# Patient Record
Sex: FEMALE | Race: BLACK OR AFRICAN AMERICAN
[De-identification: names, ages, dates, MRNs, and addresses within clinical notes are randomized per-mention and may not be internally consistent; named-entity substitution may affect disease eponyms.]

---

## 2020-01-23 ENCOUNTER — HOSPITAL ENCOUNTER (EMERGENCY)
Dept: HOSPITAL 54 - ER | Age: 25
Discharge: HOME | End: 2020-01-23
Payer: SELF-PAY

## 2020-01-23 VITALS — DIASTOLIC BLOOD PRESSURE: 70 MMHG | SYSTOLIC BLOOD PRESSURE: 112 MMHG

## 2020-01-23 VITALS — HEIGHT: 60 IN | WEIGHT: 130 LBS | BODY MASS INDEX: 25.52 KG/M2

## 2020-01-23 DIAGNOSIS — J10.1: Primary | ICD-10-CM

## 2020-01-23 DIAGNOSIS — R07.89: ICD-10-CM

## 2020-01-23 DIAGNOSIS — Z88.0: ICD-10-CM

## 2020-01-23 NOTE — NUR
PT CAME INTO ER W/ FAMILY TO  BED 11 C/O COUGH. PT STATES THAT SHE WAS 
DIAGNOSED WITH INFLUENZA B LAST WEEK ON THURSDAY 01/16 AND PNEUMONIA ON SUNDAY 01/19. PT STATES THAT SHE HAD SOME CHEST PAIN LAST NIGHT FROM COUGHING. 
CURRENTLY DENIES ANY PAIN. PT STATES THAT SHE HAS PRODUCTIVE COUGH W/ CLEAR 
SPUTUM. NO SORE THROAT. AAOX4 BREATHING EVENLY AND UNLABORED. NO SOB. CONNECTED 
TO THE MONITOR.